# Patient Record
Sex: MALE | Race: WHITE | NOT HISPANIC OR LATINO | URBAN - METROPOLITAN AREA
[De-identification: names, ages, dates, MRNs, and addresses within clinical notes are randomized per-mention and may not be internally consistent; named-entity substitution may affect disease eponyms.]

---

## 2017-03-13 ENCOUNTER — EMERGENCY (EMERGENCY)
Facility: HOSPITAL | Age: 52
LOS: 1 days | Discharge: HOME | End: 2017-03-13
Attending: EMERGENCY MEDICINE
Payer: MEDICAID

## 2017-03-13 VITALS
TEMPERATURE: 97 F | WEIGHT: 309.97 LBS | DIASTOLIC BLOOD PRESSURE: 60 MMHG | RESPIRATION RATE: 20 BRPM | SYSTOLIC BLOOD PRESSURE: 120 MMHG | HEART RATE: 88 BPM | HEIGHT: 76 IN | OXYGEN SATURATION: 97 %

## 2017-03-13 DIAGNOSIS — I10 ESSENTIAL (PRIMARY) HYPERTENSION: ICD-10-CM

## 2017-03-13 DIAGNOSIS — Z87.442 PERSONAL HISTORY OF URINARY CALCULI: ICD-10-CM

## 2017-03-13 DIAGNOSIS — J06.9 ACUTE UPPER RESPIRATORY INFECTION, UNSPECIFIED: ICD-10-CM

## 2017-03-13 PROCEDURE — 71010: CPT | Mod: 26

## 2017-03-13 PROCEDURE — 71045 X-RAY EXAM CHEST 1 VIEW: CPT

## 2017-03-13 PROCEDURE — 96374 THER/PROPH/DIAG INJ IV PUSH: CPT

## 2017-03-13 PROCEDURE — 99282 EMERGENCY DEPT VISIT SF MDM: CPT

## 2017-03-13 PROCEDURE — 99284 EMERGENCY DEPT VISIT MOD MDM: CPT | Mod: 25

## 2017-03-13 RX ORDER — IBUPROFEN 200 MG
600 TABLET ORAL ONCE
Qty: 0 | Refills: 0 | Status: COMPLETED | OUTPATIENT
Start: 2017-03-13 | End: 2017-03-13

## 2017-03-13 RX ORDER — DEXAMETHASONE 0.5 MG/5ML
10 ELIXIR ORAL ONCE
Qty: 0 | Refills: 0 | Status: COMPLETED | OUTPATIENT
Start: 2017-03-13 | End: 2017-03-13

## 2017-03-13 RX ADMIN — Medication 10 MILLIGRAM(S): at 14:18

## 2017-03-13 RX ADMIN — Medication 600 MILLIGRAM(S): at 15:22

## 2017-03-13 RX ADMIN — Medication 600 MILLIGRAM(S): at 14:19

## 2017-03-13 NOTE — ED PROVIDER NOTE - NS ED MD SCRIBE ATTENDING SCRIBE SECTIONS
HIV/PAST MEDICAL/SURGICAL/SOCIAL HISTORY/VITAL SIGNS( Pullset)/REVIEW OF SYSTEMS/PHYSICAL EXAM/HISTORY OF PRESENT ILLNESS/DISPOSITION

## 2017-03-13 NOTE — ED PROVIDER NOTE - OBJECTIVE STATEMENT
51 y/o M pt w/ PMHx of DM presents to ED c/o headache, sore throat, nasal congestion, and dry cough x4 days. Pt denies fever, SOB, N/V/D, abd pain, rash, or any other complaints. Pt reports multiple sick contacts; pt reports no recent travel. NKDA.

## 2017-03-13 NOTE — ED PROVIDER NOTE - MEDICAL DECISION MAKING DETAILS
51 y/o M pt w/ cough, headache, sore throat, and nasal congestion. Will get X-ray, Motrin, Decadron, likely D/C home w/ PMD f/u.

## 2017-03-13 NOTE — ED PROVIDER NOTE - PHYSICAL EXAMINATION
PE: CONSTITUTIONAL: Well appearing, well nourished, in no apparent distress. ENMT: Airway patent, mouth with normal mucosa. Nasal congestion, throat erythema, no purulent exudate, no tender cervical lymphadenopathy HEAD: NCAT EYES: PERRL, EOMI CARDIAC: RRR, no m/r/g, no pedal edema RESPIRATORY: CTA b/l, no adventitious sounds GI: Abdomen non-distended, non-tender MSK: Spine appears normal, range of motion is not limited, no muscle/joint tenderness NEURO: CNII-XII grossly intact, 5/5 strength, full sensation all extremities, gait intact SKIN: No rash

## 2017-03-13 NOTE — ED ADULT NURSE NOTE - OBJECTIVE STATEMENT
STATES HE HAS COUGH, SORETHROAT, HEADACHE, NASAL CONGESTION SINCE FRIDAY. NO SOB NOTED. STATES HE HAS COUGH, SORETHROAT, HEADACHE, NASAL CONGESTION SINCE FRIDAY. NO SOB NOTED.SEEN AND EXAMINED BY DR. RANKIN.
